# Patient Record
Sex: FEMALE | Race: OTHER | HISPANIC OR LATINO | URBAN - METROPOLITAN AREA
[De-identification: names, ages, dates, MRNs, and addresses within clinical notes are randomized per-mention and may not be internally consistent; named-entity substitution may affect disease eponyms.]

---

## 2017-01-04 ENCOUNTER — EMERGENCY (EMERGENCY)
Facility: HOSPITAL | Age: 62
LOS: 1 days | Discharge: HOME | End: 2017-01-04
Attending: EMERGENCY MEDICINE | Admitting: EMERGENCY MEDICINE
Payer: SELF-PAY

## 2017-01-04 VITALS
OXYGEN SATURATION: 97 % | RESPIRATION RATE: 16 BRPM | SYSTOLIC BLOOD PRESSURE: 187 MMHG | TEMPERATURE: 98 F | HEART RATE: 73 BPM | DIASTOLIC BLOOD PRESSURE: 100 MMHG

## 2017-01-04 PROCEDURE — 99284 EMERGENCY DEPT VISIT MOD MDM: CPT | Mod: 25

## 2017-01-04 PROCEDURE — 99053 MED SERV 10PM-8AM 24 HR FAC: CPT

## 2017-01-04 PROCEDURE — 93010 ELECTROCARDIOGRAM REPORT: CPT

## 2017-01-04 NOTE — ED ADULT TRIAGE NOTE - CHIEF COMPLAINT QUOTE
pt reports her family member  here earlier today. as per family member pt has witnessed syncope. pt denies symptology at this time, reports denies any symptoms before syncope.

## 2017-01-05 VITALS
DIASTOLIC BLOOD PRESSURE: 84 MMHG | SYSTOLIC BLOOD PRESSURE: 128 MMHG | OXYGEN SATURATION: 100 % | HEART RATE: 65 BPM | RESPIRATION RATE: 16 BRPM

## 2017-01-05 PROCEDURE — 93005 ELECTROCARDIOGRAM TRACING: CPT

## 2017-01-05 PROCEDURE — 99283 EMERGENCY DEPT VISIT LOW MDM: CPT | Mod: 25

## 2017-01-05 RX ADMIN — Medication 0.2 MILLIGRAM(S): at 01:52

## 2017-01-05 NOTE — ED PROVIDER NOTE - NS ED MD SCRIBE ATTENDING SCRIBE SECTIONS
PROGRESS NOTE/HIV/DISPOSITION/PAST MEDICAL/SURGICAL/SOCIAL HISTORY/RESULTS/PHYSICAL EXAM/HISTORY OF PRESENT ILLNESS/REVIEW OF SYSTEMS/CONSULTATIONS/SHIFT CHANGE/VITAL SIGNS( Pullset)

## 2017-01-05 NOTE — ED ADULT NURSE NOTE - OBJECTIVE STATEMENT
With  at bedside, pt awake and alert x3 with family at bedside. Pt reports fainting after hearing about a death in the family today. Pt denies any recent headaches. Pt denies lightheadedness or dizziness. Pt denies nausea or vomiting. Pt denies chest pain and SOB. Pt denies LOC or hitting head. Pt denies use of blood thinners. Pt's son states pt has history of HTN but does not take medication. Pt resting comfortably in bed, family at bedside, will continue to monitor.

## 2017-01-05 NOTE — ED ADULT NURSE NOTE - NS ED NURSE DC INFO COMPLEXITY
Verbalized Understanding/Simple: Patient demonstrates quick and easy understanding/Patient asked questions

## 2017-01-05 NOTE — ED ADULT NURSE NOTE - CHPI ED SYMPTOMS NEG
no vomiting/no chest wall tenderness/no chest pain/no chills/no loss of consciousness/no cough/no dizziness/no chest tightness/no shortness of breath/no chest discomfort

## 2017-01-05 NOTE — ED PROVIDER NOTE - OBJECTIVE STATEMENT
60 y/o female with a hx of CVA presents to the ED s/p syncopic episode that onset today. Family notes that pt was sitting down, heard news that family member passed away and pt fainted. Episode lasted approx 30 seconds. Denies hitting her head, numbness, tingling, fever, HA, CP, or leg swelling. States that she feels perfectly fine at this time. Interpreted by family member as per pt's request. No further complaints at this time.

## 2020-07-28 ENCOUNTER — EMERGENCY (EMERGENCY)
Facility: HOSPITAL | Age: 65
LOS: 1 days | Discharge: HOME | End: 2020-07-28
Attending: EMERGENCY MEDICINE
Payer: MEDICAID

## 2020-07-28 VITALS
DIASTOLIC BLOOD PRESSURE: 99 MMHG | HEART RATE: 83 BPM | RESPIRATION RATE: 18 BRPM | OXYGEN SATURATION: 98 % | SYSTOLIC BLOOD PRESSURE: 196 MMHG

## 2020-07-28 VITALS
RESPIRATION RATE: 20 BRPM | OXYGEN SATURATION: 93 % | SYSTOLIC BLOOD PRESSURE: 177 MMHG | HEART RATE: 98 BPM | TEMPERATURE: 98 F | DIASTOLIC BLOOD PRESSURE: 91 MMHG

## 2020-07-28 LAB
ALBUMIN SERPL ELPH-MCNC: 4.3 G/DL — SIGNIFICANT CHANGE UP (ref 3.3–5.2)
ALP SERPL-CCNC: 152 U/L — HIGH (ref 40–120)
ALT FLD-CCNC: 34 U/L — HIGH
ANION GAP SERPL CALC-SCNC: 14 MMOL/L — SIGNIFICANT CHANGE UP (ref 5–17)
AST SERPL-CCNC: 37 U/L — HIGH
BASOPHILS # BLD AUTO: 0.05 K/UL — SIGNIFICANT CHANGE UP (ref 0–0.2)
BASOPHILS NFR BLD AUTO: 0.6 % — SIGNIFICANT CHANGE UP (ref 0–2)
BILIRUB SERPL-MCNC: 0.2 MG/DL — LOW (ref 0.4–2)
BUN SERPL-MCNC: 22 MG/DL — HIGH (ref 8–20)
CALCIUM SERPL-MCNC: 9.2 MG/DL — SIGNIFICANT CHANGE UP (ref 8.6–10.2)
CHLORIDE SERPL-SCNC: 106 MMOL/L — SIGNIFICANT CHANGE UP (ref 98–107)
CO2 SERPL-SCNC: 23 MMOL/L — SIGNIFICANT CHANGE UP (ref 22–29)
CREAT SERPL-MCNC: 1.07 MG/DL — SIGNIFICANT CHANGE UP (ref 0.5–1.3)
EOSINOPHIL # BLD AUTO: 0.13 K/UL — SIGNIFICANT CHANGE UP (ref 0–0.5)
EOSINOPHIL NFR BLD AUTO: 1.6 % — SIGNIFICANT CHANGE UP (ref 0–6)
GLUCOSE SERPL-MCNC: 107 MG/DL — HIGH (ref 70–99)
HCT VFR BLD CALC: 35.1 % — SIGNIFICANT CHANGE UP (ref 34.5–45)
HGB BLD-MCNC: 11.8 G/DL — SIGNIFICANT CHANGE UP (ref 11.5–15.5)
IMM GRANULOCYTES NFR BLD AUTO: 0.4 % — SIGNIFICANT CHANGE UP (ref 0–1.5)
LYMPHOCYTES # BLD AUTO: 2.03 K/UL — SIGNIFICANT CHANGE UP (ref 1–3.3)
LYMPHOCYTES # BLD AUTO: 25.7 % — SIGNIFICANT CHANGE UP (ref 13–44)
MCHC RBC-ENTMCNC: 29.5 PG — SIGNIFICANT CHANGE UP (ref 27–34)
MCHC RBC-ENTMCNC: 33.6 GM/DL — SIGNIFICANT CHANGE UP (ref 32–36)
MCV RBC AUTO: 87.8 FL — SIGNIFICANT CHANGE UP (ref 80–100)
MONOCYTES # BLD AUTO: 0.53 K/UL — SIGNIFICANT CHANGE UP (ref 0–0.9)
MONOCYTES NFR BLD AUTO: 6.7 % — SIGNIFICANT CHANGE UP (ref 2–14)
NEUTROPHILS # BLD AUTO: 5.12 K/UL — SIGNIFICANT CHANGE UP (ref 1.8–7.4)
NEUTROPHILS NFR BLD AUTO: 65 % — SIGNIFICANT CHANGE UP (ref 43–77)
PHENOBARB SERPL-MCNC: 8.8 UG/ML — LOW (ref 15–40)
PLATELET # BLD AUTO: 289 K/UL — SIGNIFICANT CHANGE UP (ref 150–400)
POTASSIUM SERPL-MCNC: 3.7 MMOL/L — SIGNIFICANT CHANGE UP (ref 3.5–5.3)
POTASSIUM SERPL-SCNC: 3.7 MMOL/L — SIGNIFICANT CHANGE UP (ref 3.5–5.3)
PROT SERPL-MCNC: 7.5 G/DL — SIGNIFICANT CHANGE UP (ref 6.6–8.7)
RBC # BLD: 4 M/UL — SIGNIFICANT CHANGE UP (ref 3.8–5.2)
RBC # FLD: 13 % — SIGNIFICANT CHANGE UP (ref 10.3–14.5)
SODIUM SERPL-SCNC: 143 MMOL/L — SIGNIFICANT CHANGE UP (ref 135–145)
WBC # BLD: 7.89 K/UL — SIGNIFICANT CHANGE UP (ref 3.8–10.5)
WBC # FLD AUTO: 7.89 K/UL — SIGNIFICANT CHANGE UP (ref 3.8–10.5)

## 2020-07-28 PROCEDURE — 80184 ASSAY OF PHENOBARBITAL: CPT

## 2020-07-28 PROCEDURE — 99285 EMERGENCY DEPT VISIT HI MDM: CPT

## 2020-07-28 PROCEDURE — 36415 COLL VENOUS BLD VENIPUNCTURE: CPT

## 2020-07-28 PROCEDURE — 96361 HYDRATE IV INFUSION ADD-ON: CPT

## 2020-07-28 PROCEDURE — 85027 COMPLETE CBC AUTOMATED: CPT

## 2020-07-28 PROCEDURE — 70450 CT HEAD/BRAIN W/O DYE: CPT | Mod: 26

## 2020-07-28 PROCEDURE — 70450 CT HEAD/BRAIN W/O DYE: CPT

## 2020-07-28 PROCEDURE — 80053 COMPREHEN METABOLIC PANEL: CPT

## 2020-07-28 PROCEDURE — 72125 CT NECK SPINE W/O DYE: CPT | Mod: 26

## 2020-07-28 PROCEDURE — 96374 THER/PROPH/DIAG INJ IV PUSH: CPT

## 2020-07-28 PROCEDURE — 82962 GLUCOSE BLOOD TEST: CPT

## 2020-07-28 PROCEDURE — T1013: CPT

## 2020-07-28 PROCEDURE — 90715 TDAP VACCINE 7 YRS/> IM: CPT

## 2020-07-28 PROCEDURE — 70486 CT MAXILLOFACIAL W/O DYE: CPT | Mod: 26

## 2020-07-28 PROCEDURE — 70486 CT MAXILLOFACIAL W/O DYE: CPT

## 2020-07-28 PROCEDURE — 72125 CT NECK SPINE W/O DYE: CPT

## 2020-07-28 PROCEDURE — 90471 IMMUNIZATION ADMIN: CPT

## 2020-07-28 PROCEDURE — 99284 EMERGENCY DEPT VISIT MOD MDM: CPT | Mod: 25

## 2020-07-28 RX ORDER — TETANUS TOXOID, REDUCED DIPHTHERIA TOXOID AND ACELLULAR PERTUSSIS VACCINE, ADSORBED 5; 2.5; 8; 8; 2.5 [IU]/.5ML; [IU]/.5ML; UG/.5ML; UG/.5ML; UG/.5ML
0.5 SUSPENSION INTRAMUSCULAR ONCE
Refills: 0 | Status: COMPLETED | OUTPATIENT
Start: 2020-07-28 | End: 2020-07-28

## 2020-07-28 RX ORDER — SODIUM CHLORIDE 9 MG/ML
1000 INJECTION INTRAMUSCULAR; INTRAVENOUS; SUBCUTANEOUS ONCE
Refills: 0 | Status: COMPLETED | OUTPATIENT
Start: 2020-07-28 | End: 2020-07-28

## 2020-07-28 RX ORDER — ACETAMINOPHEN 500 MG
975 TABLET ORAL ONCE
Refills: 0 | Status: COMPLETED | OUTPATIENT
Start: 2020-07-28 | End: 2020-07-28

## 2020-07-28 RX ORDER — KETOROLAC TROMETHAMINE 30 MG/ML
15 SYRINGE (ML) INJECTION ONCE
Refills: 0 | Status: DISCONTINUED | OUTPATIENT
Start: 2020-07-28 | End: 2020-07-28

## 2020-07-28 RX ADMIN — Medication 975 MILLIGRAM(S): at 18:35

## 2020-07-28 RX ADMIN — Medication 975 MILLIGRAM(S): at 17:52

## 2020-07-28 RX ADMIN — SODIUM CHLORIDE 1000 MILLILITER(S): 9 INJECTION INTRAMUSCULAR; INTRAVENOUS; SUBCUTANEOUS at 17:26

## 2020-07-28 RX ADMIN — Medication 15 MILLIGRAM(S): at 19:20

## 2020-07-28 RX ADMIN — SODIUM CHLORIDE 1000 MILLILITER(S): 9 INJECTION INTRAMUSCULAR; INTRAVENOUS; SUBCUTANEOUS at 18:35

## 2020-07-28 RX ADMIN — TETANUS TOXOID, REDUCED DIPHTHERIA TOXOID AND ACELLULAR PERTUSSIS VACCINE, ADSORBED 0.5 MILLILITER(S): 5; 2.5; 8; 8; 2.5 SUSPENSION INTRAMUSCULAR at 17:24

## 2020-07-28 NOTE — ED ADULT NURSE REASSESSMENT NOTE - NS ED NURSE REASSESS COMMENT FT1
MD [place] bedside with ED . pt asymptomatic with elevated BP, pt with known hx of HTN but not managed. No intervention at this time, information given to pt and to family members regarding follow up visit with PMD, pt verbalized understanding. Pt to be d/c'd, information given by provider.

## 2020-07-28 NOTE — ED ADULT NURSE NOTE - NSIMPLEMENTINTERV_GEN_ALL_ED
Implemented All Fall Risk Interventions:  [place] to call system. Call bell, personal items and telephone within reach. Instruct patient to call for assistance. Room bathroom lighting operational. Non-slip footwear when patient is off stretcher. Physically safe environment: no spills, clutter or unnecessary equipment. Stretcher in lowest position, wheels locked, appropriate side rails in place. Provide visual cue, wrist band, yellow gown, etc. Monitor gait and stability. Monitor for mental status changes and reorient to person, place, and time. Review medications for side effects contributing to fall risk. Reinforce activity limits and safety measures with patient and family.

## 2020-07-28 NOTE — ED PROVIDER NOTE - CLINICAL SUMMARY MEDICAL DECISION MAKING FREE TEXT BOX
~65y F w/ hx seizures, presenting after unwitnessed seizure today.  Pt sustained abrasions/hematoma to R face, currently AAOx2.  Will obtain CT head/cspine/maxillofacial, labs.  Treat with fluids and reassess. 64y F w/ hx seizures, presenting after unwitnessed seizure today.  Pt sustained abrasions/hematoma to R face, currently AAOx2.  Will obtain CT head/cspine/maxillofacial, labs.  Treat with fluids, tylenol and reassess. 64y F w/ hx seizures, presenting after unwitnessed seizure today.  Pt sustained abrasions/hematoma to R face, currently AAOx2.  Likely post-ictal.  Will obtain CT head/cspine/maxillofacial, labs.  Treat with fluids, tylenol and reassess.

## 2020-07-28 NOTE — ED ADULT NURSE NOTE - OBJECTIVE STATEMENT
Assumed care at 1830 pt received from critical care, pt AOX4 in no apparent distress, pt noted to have hematoma to right eye lid and right cheek with abrasions that are clean and dry, no active bleeding. pt sates she does not remember what happens but thinks she had a seizure, pts last seizure was about a couple of months ago, pt states she is compliant with her medications.

## 2020-07-28 NOTE — ED PROVIDER NOTE - DOMESTIC TRAVEL HIGH RISK QUESTION
Please follow up as needed or sooner if needed. Please call the office with any questions or concerns you may have in the future.
Unable to Assess

## 2020-07-28 NOTE — ED ADULT NURSE NOTE - CHIEF COMPLAINT QUOTE
Patient found down by family with abrasions to right face, hx of seizure last known seizure a week ago.  Patient is unsure of what happened.  EMS states pt takes phenobarb.  Dr. Nguyen at bedside, priority CT called.

## 2020-07-28 NOTE — ED PROVIDER NOTE - PHYSICAL EXAMINATION
Constitutional: Awake, alert, in no acute distress  Eyes: no scleral icterus  HENT: no palpable scalp deformity, +hematoma to R temporal area with overlying abrasion, +scattered superficial abrasions over R face, +superficial abrasion to R tongue, moist oral mucosa  Neck: no tenderness  CV: RRR, no murmur  Pulm: non-labored respirations, CTAB  Abdomen: soft, non-tender, non-distended  Back: no tenderness  Extremities: no edema, no deformity  Skin: no rash, no jaundice  Neuro: AAOx2, moving all extremities equally Constitutional: Awake, alert, in no acute distress  Eyes: no scleral icterus  HENT: no palpable scalp deformity, +hematoma to R lateral periorbital area with overlying abrasion, +scattered superficial abrasions over R face, +superficial abrasion to R tongue, moist oral mucosa  Neck: no tenderness  CV: RRR, no murmur  Pulm: non-labored respirations, CTAB  Abdomen: soft, non-tender, non-distended  Back: no tenderness  Extremities: no edema, no deformity  Skin: no rash, no jaundice  Neuro: AAOx2, moving all extremities equally

## 2020-07-28 NOTE — ED PROVIDER NOTE - OBJECTIVE STATEMENT
~65y F w/ hx seizure disorder, presenting after seizure.  Pt was found outside on pavement by family after apparent unwitnessed seizure.  Pt sustained abrasions to R side of face.  Currently complains of pain to R temporal area; denies other complaints.  Reports biting her tongue; denies incontinence.  Pt reportedly on phenobarbital for seizures; denies any missed doses.  Does not see a neurologist; says she gets her medication sent to her from [facility].  Last had a seizure last week.  Last tetanus unknown. ~65y F w/ hx seizure disorder, presenting after seizure.  Pt was found outside on pavement by family after apparent seizure.  Pt sustained abrasions to R side of face.  Currently complains of pain to R periorbital area; denies other complaints.  Reports biting her tongue; denies incontinence.  Pt reportedly on phenobarbital for seizures; denies any missed doses.  Does not see a neurologist; says she gets her medication sent to her from [facility].  Last had a seizure few weeks ago.  Last tetanus unknown.

## 2020-07-28 NOTE — ED PROVIDER NOTE - NS ED ROS FT
Constitutional: no fever, no chills  Eyes: no vision changes  ENT: no nasal congestion, no sore throat  CV: no chest pain  Resp: no cough, no shortness of breath  GI: no abdominal pain, no vomiting, no diarrhea  : no dysuria  MSK: no joint pain  Skin: no rash  Neuro: +headache, +seizure, no weakness, no paresthesias

## 2020-07-28 NOTE — ED PROVIDER NOTE - PROGRESS NOTE DETAILS
On reevaluation, pt reports feeling better. AAOx3, ambulating in the ED.  Additional hx obtained from pt's family, who confirm that she is taking phenobarbital 100 mg daily.  Last had a seizure ~3 weeks ago.  Seizure today was witnessed by a relative.  Pt does not follow with any primary care doctor or neurologist.  Pt noted to have elevated BP of 196/99; denies any headache, chest pain, SOB.  Pt has known hx of HTN but does not take any medication.  Will send Rx for HCTZ.  Pt counseled on the need for follow up with PCP and neurologist, and to return for new/worsening symptoms. On reevaluation, pt reports feeling better. AAOx3, ambulating in the ED.  Additional hx obtained from pt's family, who confirm that she is taking phenobarbital 100 mg daily.  Last had a seizure ~3 weeks ago.  Seizure today was witnessed by a relative.  Pt does not follow with any primary care doctor or neurologist.  Pt noted to have elevated BP of 196/99; denies any headache, chest pain, SOB.  Pt has known hx of HTN but does not take any medication.  Will send Rx for HCTZ.  Pt counseled on the need for follow up with PCP and neurologist, and to return for new/worsening symptoms.  Will provide referral to [facility] clinic and neurology.

## 2020-07-28 NOTE — ED PROVIDER NOTE - NSFOLLOWUPINSTRUCTIONS_ED_ALL_ED_FT
Casey un seguimiento con la clínica para establecer un médico de atención primaria y con un neurólogo con respecto a las convulsiones. [place] los medicamentos para la presión arterial según lo prescrito. [place] tylenol o ibuprofeno según sea necesario para el dolor. Regrese a la jennie de emergencias por empeoramiento del dolor de ryan, vómitos u otros síntomas nuevos.    ---------------------------------    Epilepsia  Epilepsy  La epilepsia es laura afección en la cual la persona tiene convulsiones repetidas con el paso del tiempo. Laura convulsión es laura ráfaga repentina de actividad eléctrica y química anormal en el cerebro. Las convulsiones pueden provocar un cambio en la atención, la conducta o la capacidad de mantenerse despierto y alerta (estado mental alterado).  La epilepsia aumenta el riesgo de caídas, accidentes y lesiones. También puede producir complicaciones, que incluyen lo siguiente:  Depresión.Maria Teresa memoria.Muerte súbita e inexplicable en epilepsia (SUDEP). Esta complicación es poco frecuente, y no se conoce la causa.La mayoría de las personas con epilepsia tiene laura jet normal.  ¿Cuáles son las causas?  Esta afección puede ser causada por lo siguiente:  Lesión en la ryan.Lesión que ocurre al nacer.Fiebre doe calrie la infancia.Accidente cerebrovascular.Hemorragias en el cerebro o alrededor de kay.Ciertos medicamentos y drogas.Tener muy poco oxígeno carlie un período prolongado.Desarrollo anormal del cerebro.Ciertas infecciones, chelita meningitis y encefalitis.Tumores cerebrales.Afecciones que se transmiten de padres a hijos (hereditarias).¿Cuáles son los signos o los síntomas?  Los síntomas de laura convulsión varían mucho de laura persona a otra. Pueden incluir los siguientes:  Espasmos.El cuerpo se pone rígido.Movimientos involuntarios de los brazos o las piernas.Pérdida del conocimiento.Si tiene problemas respiratorios.Caída repentina.Confusión.Movimientos de asentimiento con la ryan.Parpadeo o movimientos de abrir y cerrar los ojos.Chasquido de labios.Babeo.Movimientos rápidos de los ojos.Gruñidos.Pérdida del control del intestino y de la vejiga.Mirar fijamente.Falta de respuesta.Algunas personas tienen síntomas apenas antes de que ocurra laura convulsión (aura) e inmediatamente después de la convulsión. Los síntomas del aura incluyen los siguientes:  [place] o ansiedad.Náuseas.Sensación de que la habitación da vueltas (vértigo).Sensación de gigi visto o escuchado algo antes (déjà vu).Percepción de sabores u olores extraños.Cambios en la visión, chelita christine manchas o luces destellantes.Los síntomas que ocurren después de laura convulsión incluyen los siguientes:  Confusión.Somnolencia.Dolor de ryan.¿Cómo se diagnostica?  Esta afección se diagnostica en función de lo siguiente:  Janel síntomas.Janel antecedentes médicos.Un examen físico.Un examen neurológico. Un examen neurológico es similar a un examen físico. Consiste en examinar la fuerza, los reflejos, la coordinación y las sensaciones.Estudios, chelita, por ejemplo:  Un estudio indoloro que crea un diagrama de las ondas cerebrales (electroencefalograma oEEG).Laura resonancia magnética (RM).Laura exploración por tomografía computarizada (TC).Laura punción lumbar, también llamada punción monroe.Análisis de sophie para detectar signos de infección o bioquímica anormal de la sophie.¿Cómo se trata?  El tratamiento puede controlar las convulsiones. Algunos tipos de epilepsia necesitan tratamiento de por jet y otros desaparecen con el tiempo. El tratamiento de esta afección puede incluir lo siguiente:  Kendra medicamentos para controlar las convulsiones.Tener implantado en el tórax un dispositivo llamado estimulador del nervio vago. El dispositivo envía impulsos eléctricos al nervio vago y al cerebro para prevenir las convulsiones. Se puede recomendar kay tratamiento si los medicamentos no son de ayuda.Cirugía de cerebro. Hay varios tipos de cirugías que se pueden realizar para impedir que ocurran las convulsiones o para reducir ramos frecuencia.Realizarse análisis de sophie periódicos. Es posible que deba realizarse análisis de sophie de forma periódica para comprobar que esté recibiendo la cantidad adecuada de medicamentos.Laura vez que se diagnostica esta afección, es importante comenzar un tratamiento lo antes posible. En algunas personas, la epilepsia desaparece con el tiempo.  Siga estas instrucciones en ramos casa:  Medicamentos     [place] los medicamentos de venta raman y los recetados solamente chelita se lo haya indicado el médico.Evite cualquier sustancia que pueda interferir con el funcionamiento de ramos medicamento, chelita el alcohol.Actividad     Descanse lo suficiente. La falta de sueño puede aumentar la probabilidad de sufrir convulsiones.Siga las instrucciones del médico sobre conducir, nadar y realizar otras actividades que podrían ser peligrosas si tuviera laura convulsión.  Si vive en los [place], consulte al [employer]art[employer] de Veh[employer] Motoriz[employer] (DMV) local para averiguar sobre las leyes de tránsito locales. Cada estado tiene normas específicas sobre cuándo puede volver a conducir legalmente.Educar a los demás     Enseñe a janel amigos y familiares lo que deben hacer si tiene laura convulsión. Ellos deben hacer lo siguiente:  Acostarlo en el suelo para evitar que se caiga.Colocarle cosas que amortigüen debajo de la ryan y el cuerpo.Aflojar la ropa apretada alrededor de ramos radha.Recostarlo sobre un lado. En radha de tener vómitos, esto ayuda a mantener las vías respiratorias despejadas.Quedarse con usted hasta que se recupere.No sostenerlo presionándolo hacia abajo. Hacer esto no detendrá la convulsión.No colocarle nada en la boca.Saber si usted necesita atención de emergencia o no.Instrucciones generales     Evite cualquier cosa que en algún momento le haya desencadenado laura convulsión.Lleve un diario de janel convulsiones. Registre lo que recuerde sobre cada convulsión, en especial, cualquier cosa que pueda gigi desencadenado la convulsión.Concurra a todas las visitas de seguimiento chelita se lo haya indicado el médico. [place] es importante.Comuníquese con un médico si:  Ramos patrón de convulsiones cambia.Tiene síntomas de infección u otra enfermedad. [place] podría aumentar el riesgo de tener laura convulsión.Solicite ayuda inmediatamente si:  Tiene lo siguiente:  Laura convulsión que dura más de 5 minutos.Varias convulsiones consecutivas sin laura recuperación completa entre laura convulsión y la otra.Laura convulsión que le dificulta la respiración.Laura convulsión que es diferente a las convulsiones anteriores.Laura convulsión que lo jose alberto sin poder hablar ni usar laura parte del cuerpo.No se despertó de inmediato después de laura convulsión.Estos síntomas pueden representar un problema grave que constituye laura emergencia. No espere a christine si los síntomas desaparecen. Solicite atención médica de inmediato. Comuníquese con el servicio de emergencias de ramos localidad (911 en los [place]). No conduzca por janel propios medios hasta el hospital.   Resumen  La epilepsia es laura afección en la cual la persona tiene convulsiones repetidas con el paso del tiempo. Algunos tipos de epilepsia necesitan tratamiento de por jet y otros desaparecen con el tiempo.Las convulsiones pueden causar muchos síntomas, desde episodios breves de ausencia o movimientos involuntarios de los brazos o las piernas hasta convulsiones con pérdida de la conciencia.El tratamiento es eficaz para controlar las convulsiones. [place] los medicamentos de venta raman y los recetados solamente chelita se lo haya indicado el médico.Siga las instrucciones del médico sobre conducir, nadar y realizar otras actividades que podrían ser peligrosas si tuviera laura convulsión.Enseñe a janel amigos y familiares lo que deben hacer si tiene laura convulsión.Esta información no tiene chelita fin reemplazar el consejo del médico. Asegúrese de hacerle al médico cualquier pregunta que tenga.    ----------------------------    Hipertensión en los adultos  Hypertension, Adult  La presión arterial doe (hipertensión) se produce cuando la fuerza de la sophie bombea a través de las arterias con mucha fuerza. Las arterias son los vasos sanguíneos que transportan la sophie desde el corazón al citlaly del cuerpo. La hipertensión hace que el corazón casey más esfuerzo para bombear sophie y puede provocar que las arterias se estrechen o endurezcan. La hipertensión no tratada o no controlada puede causar infarto de miocardio, insuficiencia cardíaca, accidente cerebrovascular, enfermedad renal y otros problemas.  Laura lectura de la presión arterial consta de un número más alto sobre un número más bajo. En condiciones ideales, la presión arterial debe estar por debajo de 120/80. El primer número (“superior”) es la presión sistólica. Es la medida de la presión de las arterias cuando el corazón late. El alhaji número (“inferior”) es la presión diastólica. Es la medida de la presión en las arterias cuando el corazón se relaja.  ¿Cuáles son las causas?  Se desconoce la causa exacta de esta afección. Hay algunas afecciones que causan presión arterial doe o están relacionadas con stephanie.  ¿Qué incrementa el riesgo?  Algunos factores de riesgo de hipertensión están bajo ramos control. Los siguientes factores pueden hacer que sea más propenso a desarrollar esta afección:  Fumar.Tener diabetes mellitus tipo 2, colesterol alto, o ambos.No hacer la cantidad suficiente de actividad física o ejercicio.Tener sobrepeso.Consumir mucha grasa, azúcar, calorías o sal (sodio) en ramos dieta.Beber alcohol en exceso.Algunos factores de riesgo para la presión arterial doe pueden ser difíciles o imposibles de cambiar. Algunos de estos factores son los siguientes:  Tener enfermedad renal crónica.Tener antecedentes familiares de presión arterial doe.Edad. Los riesgos aumentan con la edad.Tereso. El riesgo es mayor para las personas afroamericanas.Sexo. Antes de los 45 años, los hombres corren más riesgo que las mujeres. Después de los 65 años, las mujeres corren más riesgo que los hombres.Tener apnea obstructiva del sueño.Estrés.¿Cuáles son los signos o los síntomas?  Es posible que la presión arterial doe puede no cause síntomas. La presión arterial muy doe (crisis hipertensiva) puede provocar:  Dolor de ryan.Ansiedad.Falta de aire.Hemorragia nasal.Náuseas y vómitos.Cambios en la visión.Dolor de pecho intenso.Convulsiones.¿Cómo se diagnostica?  Esta afección se diagnostica al medir ramos presión arterial mientras se encuentra sentado, con el brazo apoyado sobre laura superficie plana, las piernas sin cruzar y los pies gelacio apoyados en el piso. El brazalete del tensiómetro debe colocarse directamente sobre la piel de la parte superior del brazo y al nivel de ramos corazón. Debe medirla al menos dos veces en el mismo brazo. Determinadas condiciones pueden causar laura diferencia de presión arterial entre el brazo izzy y el derecho.  Ciertos factores pueden provocar que las lecturas de la presión arterial pramod inferiores o superiores a lo normal por un período corto de tiempo:  Si ramos presión arterial es más doe cuando se encuentra en el consultorio del médico que cuando la mide en ramos hogar, se denomina “hipertensión de bata yolis”. La mayoría de las personas que tienen esta afección no deben ser medicadas.Si ramos presión arterial es más doe en el hogar que cuando se encuentra en el consultorio del médico, se denomina “hipertensión enmascarada”. La mayoría de las personas que tienen esta afección deben ser medicadas para controlar la presión arterial.Si tiene laura lecturas de presión arterial doe carlie laura visita o si tiene presión arterial normal con otros factores de riesgo, se le podrá pedir que casey lo siguiente:  Que regrese otro día para volver a controlar ramos presión arterial nuevamente.Que se controle la presión arterial en ramos casa carlie 1 semana o más.Si se le diagnostica hipertensión, es posible que se le realicen otros análisis de sophie o estudios de diagnóstico por imágenes para ayudar a ramos médico a comprender ramos riesgo general de tener otras afecciones.  ¿Cómo se trata?  Esta afección se trata haciendo cambios saludables en el estilo de jet, tales chelita ingerir alimentos saludables, realizar más ejercicio y reducir el consumo de alcohol. El médico puede recetarle medicamentos si los cambios en el estilo de jet no son suficientes para lograr controlar la presión arterial y si:  Ramos presión arterial sistólica está por encima de 130.Ramos presión arterial diastólica está por encima de 80.La presión arterial deseada puede variar en función de las enfermedades, la edad y otros factores personales.  Siga estas instrucciones en ramos casa:  Comida y bebida        Siga laura dieta con alto contenido de fibras y potasio, y con bajo contenido de sodio, azúcar agregada y grasas. Un ejemplo de plan alimenticio es la dieta DASH (Dietary Approaches to Stop Hypertension, Métodos alimenticios para detener la hipertensión). Para alimentarse de esta manera:  Coma mucha fruta y verdura fresca. Trate de que la mitad del plato de cada comida sea de frutas y verduras.Coma cereales integrales, chelita pasta integral, arroz integral o pan integral. Llene aproximadamente un cuarto del plato con cereales integrales.Coma y tristian productos lácteos con bajo contenido de grasa, chelita leche descremada o yogur bajo en grasas.Evite la ingesta de kelley de carne grasa, carne procesada o curada, y carne de ave con piel. Llene aproximadamente un cuarto del plato con proteínas magras, chelita pescado, gregorio sin piel, frijoles, huevos o tofu.Evite ingerir alimentos prehechos y procesados. En general, estos tienen mayor cantidad de sodio, azúcar agregada y grasa.Reduzca ramos ingesta diaria de sodio. La mayoría de las personas que tienen hipertensión deben comer menos de 1500 mg de sodio por día.No tristian alcohol si:  Ramos médico le indica no hacerlo.Está embarazada, puede estar embarazada o está tratando de quedar embarazada.Si juan daniel alcohol:  Limite la cantidad que juan daniel a lo siguiente:  De 0 a 1 medida por día para las mujeres.De 0 a 2 medidas por día para los hombres.Esté atento a la cantidad de alcohol que hay en las bebidas que diane. En los [place], laura medida equivale a laura botella de cerveza de 12 oz (355 ml), un vaso de vino de 5 oz (148 ml) o un vaso de laura bebida alcohólica de doe graduación de 1½ oz (44 ml).Estilo de jet        Trabaje con ramos médico para mantener un peso saludable o perder peso. Pregúntele cuál es el peso recomendado para usted.Casey al menos 30 minutos de ejercicio la mayoría de los días de la semana. Estas actividades pueden incluir caminar, nadar o andar en bicicleta.Incluya ejercicios para fortalecer janel músculos (ejercicios de resistencia), chelita [place] o levantamiento de pesas, chelita parte de ramos rutina semanal de ejercicios. Intente realizar 30 minutos de kay tipo de ejercicios al menos hayden días a la semana.No consuma ningún producto que contenga nicotina o tabaco, chelita cigarrillos, cigarrillos electrónicos y tabaco de mascar. Si necesita ayuda para dejar de fumar, consulte al médico.Contrólese la presión arterial en ramos casa según las indicaciones del médico.Concurra a todas las visitas de seguimiento chelita se lo haya indicado el médico. [place] es importante.Medicamentos     [place] los medicamentos de venta raman y los recetados solamente chelita se lo haya indicado el médico. Siga cuidadosamente las indicaciones. Los medicamentos para la presión arterial deben tomarse según las indicaciones.No omita las dosis de medicamentos para la presión arterial. Si lo hace, estará en riesgo de tener problemas y puede hacer que los medicamentos pramod menos eficaces.Pregúntele a ramos médico a qué efectos secundarios o reacciones a los medicamentos debe prestar atención.Comuníquese con un médico si:  Piensa que tiene laura reacción a un medicamento que está tomando.Tiene pablo de ryan frecuentes (recurrentes).Se siente mareado.Tiene hinchazón en los tobillos.Tiene problemas de visión.Solicite ayuda inmediatamente si:  Siente un dolor de ryan intenso o confusión.Siente debilidad inusual o adormecimiento.Siente que va a desmayarse.Siente un dolor intenso en el pecho o el abdomen.Vomita repetidas veces.Tiene dificultad para respirar.Resumen  La hipertensión se produce cuando la sophie bombea en las arterias con mucha fuerza. Si esta afección no se controla, podría correr riesgo de tener complicaciones graves.La presión arterial deseada puede variar en función de las enfermedades, la edad y otros factores personales. Para la mayoría de las personas, laura presión arterial normal es walter que 120/80.La hipertensión se trata con cambios en el estilo de jet, medicamentos o laura combinación de ambos. Los cambios en el estilo de jet incluyen pérdida de peso, ingerir alimentos sanos, seguir laura dieta baja en sodio, hacer más ejercicio y limitar el consumo de alcohol.Esta información no tiene chelita fin reemplazar el consejo del médico. Asegúrese de hacerle al médico cualquier pregunta que tenga.

## 2020-07-28 NOTE — ED PROVIDER NOTE - PATIENT PORTAL LINK FT
You can access the FollowMyHealth Patient Portal offered by [facility] by registering at the following website: [email or web address]. By joining [employer]’s FollowMyHealth portal, you will also be able to view your health information using other applications (apps) compatible with our system.

## 2020-07-28 NOTE — ED PROVIDER NOTE - CARE PROVIDER_API CALL
Joe Martin  NEUROLOGY  [address] [place] [address]  Phone: [phone]  Fax: [phone]  Follow Up Time:

## 2020-07-28 NOTE — ED PROVIDER NOTE - ATTENDING CONTRIBUTION TO CARE
Pt. awake and alert. No acute distress.+hematoma to R lateral periorbital area with overlying abrasion, +scattered superficial abrasions over R face. Lungs CTA b/l. NO seizure activity while in the ED. I, Dr. Pruitt, performed a face to face bedside interview with this patient regarding history of present illness, review of symptoms and relevant past medical, social and family history.  I completed an independent physical examination.  I have also reviewed the resident's note(s) and discussed the plan with the resident.

## 2020-07-29 PROBLEM — I10 ESSENTIAL (PRIMARY) HYPERTENSION: Chronic | Status: ACTIVE | Noted: 2017-01-05

## 2021-05-26 NOTE — ED ADULT TRIAGE NOTE - CHIEF COMPLAINT QUOTE
Patient found down by family with abrasions to right face, hx of seizure last known seizure a week ago.  Patient is unsure of what happened.  EMS states pt takes phenobarb.  Dr. Nguyen at bedside, priority CT called.
[id number] Detailed

## 2024-07-27 ENCOUNTER — EMERGENCY (EMERGENCY)
Facility: HOSPITAL | Age: 69
LOS: 1 days | Discharge: HOME | End: 2024-07-27
Attending: STUDENT IN AN ORGANIZED HEALTH CARE EDUCATION/TRAINING PROGRAM
Payer: COMMERCIAL

## 2024-07-27 VITALS
SYSTOLIC BLOOD PRESSURE: 128 MMHG | OXYGEN SATURATION: 97 % | WEIGHT: 130.95 LBS | TEMPERATURE: 99 F | HEART RATE: 74 BPM | DIASTOLIC BLOOD PRESSURE: 78 MMHG | RESPIRATION RATE: 20 BRPM

## 2024-07-27 VITALS
RESPIRATION RATE: 17 BRPM | DIASTOLIC BLOOD PRESSURE: 83 MMHG | TEMPERATURE: 98 F | OXYGEN SATURATION: 98 % | HEART RATE: 74 BPM | SYSTOLIC BLOOD PRESSURE: 134 MMHG

## 2024-07-27 LAB
ALBUMIN SERPL ELPH-MCNC: 4.1 G/DL — SIGNIFICANT CHANGE UP (ref 3.3–5.2)
ALP SERPL-CCNC: 152 U/L — HIGH (ref 40–120)
ALT FLD-CCNC: 15 U/L — SIGNIFICANT CHANGE UP
ANION GAP SERPL CALC-SCNC: 13 MMOL/L — SIGNIFICANT CHANGE UP (ref 5–17)
AST SERPL-CCNC: 18 U/L — SIGNIFICANT CHANGE UP
BASOPHILS # BLD AUTO: 0.07 K/UL — SIGNIFICANT CHANGE UP (ref 0–0.2)
BASOPHILS NFR BLD AUTO: 0.9 % — SIGNIFICANT CHANGE UP (ref 0–2)
BILIRUB SERPL-MCNC: 0.3 MG/DL — LOW (ref 0.4–2)
BUN SERPL-MCNC: 16.8 MG/DL — SIGNIFICANT CHANGE UP (ref 8–20)
CALCIUM SERPL-MCNC: 8.7 MG/DL — SIGNIFICANT CHANGE UP (ref 8.4–10.5)
CHLORIDE SERPL-SCNC: 104 MMOL/L — SIGNIFICANT CHANGE UP (ref 96–108)
CO2 SERPL-SCNC: 21 MMOL/L — LOW (ref 22–29)
CREAT SERPL-MCNC: 0.93 MG/DL — SIGNIFICANT CHANGE UP (ref 0.5–1.3)
EGFR: 68 ML/MIN/1.73M2 — SIGNIFICANT CHANGE UP
EGFR: 68 ML/MIN/1.73M2 — SIGNIFICANT CHANGE UP
EOSINOPHIL # BLD AUTO: 0.27 K/UL — SIGNIFICANT CHANGE UP (ref 0–0.5)
EOSINOPHIL NFR BLD AUTO: 3.4 % — SIGNIFICANT CHANGE UP (ref 0–6)
GLUCOSE SERPL-MCNC: 97 MG/DL — SIGNIFICANT CHANGE UP (ref 70–99)
HCT VFR BLD CALC: 34.1 % — LOW (ref 34.5–45)
HGB BLD-MCNC: 10.9 G/DL — LOW (ref 11.5–15.5)
IMM GRANULOCYTES NFR BLD AUTO: 0.2 % — SIGNIFICANT CHANGE UP (ref 0–0.9)
LYMPHOCYTES # BLD AUTO: 2.41 K/UL — SIGNIFICANT CHANGE UP (ref 1–3.3)
LYMPHOCYTES # BLD AUTO: 30.1 % — SIGNIFICANT CHANGE UP (ref 13–44)
MCHC RBC-ENTMCNC: 26 PG — LOW (ref 27–34)
MCHC RBC-ENTMCNC: 32 GM/DL — SIGNIFICANT CHANGE UP (ref 32–36)
MCV RBC AUTO: 81.4 FL — SIGNIFICANT CHANGE UP (ref 80–100)
MONOCYTES # BLD AUTO: 0.61 K/UL — SIGNIFICANT CHANGE UP (ref 0–0.9)
MONOCYTES NFR BLD AUTO: 7.6 % — SIGNIFICANT CHANGE UP (ref 2–14)
NEUTROPHILS # BLD AUTO: 4.63 K/UL — SIGNIFICANT CHANGE UP (ref 1.8–7.4)
NEUTROPHILS NFR BLD AUTO: 57.8 % — SIGNIFICANT CHANGE UP (ref 43–77)
PLATELET # BLD AUTO: 349 K/UL — SIGNIFICANT CHANGE UP (ref 150–400)
POTASSIUM SERPL-MCNC: 4 MMOL/L — SIGNIFICANT CHANGE UP (ref 3.5–5.3)
POTASSIUM SERPL-SCNC: 4 MMOL/L — SIGNIFICANT CHANGE UP (ref 3.5–5.3)
PROT SERPL-MCNC: 7.6 G/DL — SIGNIFICANT CHANGE UP (ref 6.6–8.7)
RBC # BLD: 4.19 M/UL — SIGNIFICANT CHANGE UP (ref 3.8–5.2)
RBC # FLD: 14.4 % — SIGNIFICANT CHANGE UP (ref 10.3–14.5)
SODIUM SERPL-SCNC: 138 MMOL/L — SIGNIFICANT CHANGE UP (ref 135–145)
WBC # BLD: 8.01 K/UL — SIGNIFICANT CHANGE UP (ref 3.8–10.5)
WBC # FLD AUTO: 8.01 K/UL — SIGNIFICANT CHANGE UP (ref 3.8–10.5)

## 2024-07-27 PROCEDURE — 99285 EMERGENCY DEPT VISIT HI MDM: CPT

## 2024-07-27 PROCEDURE — 93010 ELECTROCARDIOGRAM REPORT: CPT

## 2024-07-27 PROCEDURE — 71045 X-RAY EXAM CHEST 1 VIEW: CPT | Mod: 26

## 2024-07-28 PROBLEM — G40.909 EPILEPSY, UNSPECIFIED, NOT INTRACTABLE, WITHOUT STATUS EPILEPTICUS: Chronic | Status: ACTIVE | Noted: 2020-07-28

## 2024-07-28 LAB
AMPHET UR-MCNC: NEGATIVE — SIGNIFICANT CHANGE UP
APPEARANCE UR: CLEAR — SIGNIFICANT CHANGE UP
BACTERIA # UR AUTO: NEGATIVE /HPF — SIGNIFICANT CHANGE UP
BARBITURATES UR SCN-MCNC: NEGATIVE — SIGNIFICANT CHANGE UP
BENZODIAZ UR-MCNC: NEGATIVE — SIGNIFICANT CHANGE UP
BILIRUB UR-MCNC: NEGATIVE — SIGNIFICANT CHANGE UP
CAST: 0 /LPF — SIGNIFICANT CHANGE UP (ref 0–4)
COCAINE METAB.OTHER UR-MCNC: NEGATIVE — SIGNIFICANT CHANGE UP
COLOR SPEC: YELLOW — SIGNIFICANT CHANGE UP
DIFF PNL FLD: NEGATIVE — SIGNIFICANT CHANGE UP
FENTANYL UR QL SCN: NEGATIVE — SIGNIFICANT CHANGE UP
GLUCOSE UR QL: NEGATIVE MG/DL — SIGNIFICANT CHANGE UP
KETONES UR-MCNC: NEGATIVE MG/DL — SIGNIFICANT CHANGE UP
LEUKOCYTE ESTERASE UR-ACNC: ABNORMAL
METHADONE UR-MCNC: NEGATIVE — SIGNIFICANT CHANGE UP
NITRITE UR-MCNC: NEGATIVE — SIGNIFICANT CHANGE UP
OPIATES UR-MCNC: NEGATIVE — SIGNIFICANT CHANGE UP
PCP SPEC-MCNC: SIGNIFICANT CHANGE UP
PCP UR-MCNC: NEGATIVE — SIGNIFICANT CHANGE UP
PH UR: 7.5 — SIGNIFICANT CHANGE UP (ref 5–8)
PROT UR-MCNC: NEGATIVE MG/DL — SIGNIFICANT CHANGE UP
RBC CASTS # UR COMP ASSIST: 1 /HPF — SIGNIFICANT CHANGE UP (ref 0–4)
SP GR SPEC: 1.01 — SIGNIFICANT CHANGE UP (ref 1–1.03)
SQUAMOUS # UR AUTO: 2 /HPF — SIGNIFICANT CHANGE UP (ref 0–5)
THC UR QL: NEGATIVE — SIGNIFICANT CHANGE UP
UROBILINOGEN FLD QL: 1 MG/DL — SIGNIFICANT CHANGE UP (ref 0.2–1)
WBC UR QL: 1 /HPF — SIGNIFICANT CHANGE UP (ref 0–5)

## 2024-07-28 PROCEDURE — 82962 GLUCOSE BLOOD TEST: CPT

## 2024-07-28 PROCEDURE — 93005 ELECTROCARDIOGRAM TRACING: CPT

## 2024-07-28 PROCEDURE — 85025 COMPLETE CBC W/AUTO DIFF WBC: CPT

## 2024-07-28 PROCEDURE — 80053 COMPREHEN METABOLIC PANEL: CPT

## 2024-07-28 PROCEDURE — 80307 DRUG TEST PRSMV CHEM ANLYZR: CPT

## 2024-07-28 PROCEDURE — 71045 X-RAY EXAM CHEST 1 VIEW: CPT

## 2024-07-28 PROCEDURE — 81001 URINALYSIS AUTO W/SCOPE: CPT

## 2024-07-28 PROCEDURE — T1013: CPT

## 2024-07-28 PROCEDURE — 87086 URINE CULTURE/COLONY COUNT: CPT

## 2024-07-28 PROCEDURE — 36415 COLL VENOUS BLD VENIPUNCTURE: CPT

## 2024-07-28 PROCEDURE — 99285 EMERGENCY DEPT VISIT HI MDM: CPT | Mod: 25

## 2024-07-28 RX ORDER — SODIUM CHLORIDE 9 G/1000ML
1000 INJECTION, SOLUTION INTRAVENOUS ONCE
Refills: 0 | Status: COMPLETED | OUTPATIENT
Start: 2024-07-28 | End: 2024-07-28

## 2024-07-28 RX ADMIN — SODIUM CHLORIDE 1000 MILLILITER(S): 9 INJECTION, SOLUTION INTRAVENOUS at 01:00

## 2024-07-29 LAB
CULTURE RESULTS: ABNORMAL
SPECIMEN SOURCE: SIGNIFICANT CHANGE UP